# Patient Record
Sex: FEMALE | Race: WHITE | URBAN - METROPOLITAN AREA
[De-identification: names, ages, dates, MRNs, and addresses within clinical notes are randomized per-mention and may not be internally consistent; named-entity substitution may affect disease eponyms.]

---

## 2017-08-02 ENCOUNTER — OFFICE VISIT (OUTPATIENT)
Dept: INTERNAL MEDICINE CLINIC | Age: 32
End: 2017-08-02

## 2017-08-02 VITALS
DIASTOLIC BLOOD PRESSURE: 84 MMHG | HEIGHT: 67 IN | BODY MASS INDEX: 27.94 KG/M2 | TEMPERATURE: 98.7 F | WEIGHT: 178 LBS | RESPIRATION RATE: 18 BRPM | HEART RATE: 98 BPM | SYSTOLIC BLOOD PRESSURE: 126 MMHG | OXYGEN SATURATION: 98 %

## 2017-08-02 DIAGNOSIS — F41.9 ANXIETY DISORDER, UNSPECIFIED TYPE: ICD-10-CM

## 2017-08-02 DIAGNOSIS — F32.81 PMDD (PREMENSTRUAL DYSPHORIC DISORDER): ICD-10-CM

## 2017-08-02 DIAGNOSIS — Z00.00 PHYSICAL EXAM: ICD-10-CM

## 2017-08-02 DIAGNOSIS — L20.82 FLEXURAL ECZEMA: Primary | ICD-10-CM

## 2017-08-02 DIAGNOSIS — Z23 NEED FOR VACCINE FOR TD (TETANUS-DIPHTHERIA): ICD-10-CM

## 2017-08-02 NOTE — MR AVS SNAPSHOT
Visit Information Date & Time Provider Department Dept. Phone Encounter #  
 8/2/2017  3:00 PM Lala Alvarez MD [facility] Internal Medicine [phone] [id number] Upcoming Health Maintenance Date Due  
 PAP AKA CERVICAL CYTOLOGY 12/1/2015 INFLUENZA AGE 9 TO ADULT 8/1/2017 DTaP/Tdap/Td series (2 - Td) 8/2/2027 Allergies as of 8/2/2017  Review Complete On: 8/2/2017 By: Lala Alvarez MD  
  
 Severity Noted Reaction Type Reactions Amoxicillin  03/07/2013    Hives Childhood reaction Current Immunizations  Never Reviewed No immunizations on file. Not reviewed this visit You Were Diagnosed With   
  
 Codes Comments Flexural eczema    -  Primary ICD-10-CM: X76.74 ICD-9-CM: 691.8 PMDD (premenstrual dysphoric disorder)     ICD-10-CM: P33.56 ICD-9-CM: 625.4 Anxiety disorder, unspecified type     ICD-10-CM: F41.9 ICD-9-CM: 300.00 Physical exam     ICD-10-CM: Z00.00 ICD-9-CM: V70.9 Need for vaccine for TD (tetanus-diphtheria)     ICD-10-CM: Ami Abide ICD-9-CM: V06.5 Vitals BP Pulse Temp Resp Height(growth percentile) Weight(growth percentile) 126/84 (BP 1 Location: Left arm, BP Patient Position: Sitting) (!) 110 98.7 °F (37.1 °C) (Oral) 18 5' 7\" (1.702 m) 178 lb (80.7 kg) LMP SpO2 BMI OB Status Smoking Status 07/18/2017 98% 27.88 kg/m2 Having regular periods Former Smoker Vitals History BMI and BSA Data Body Mass Index Body Surface Area  
 27.88 kg/m 2 1.95 m 2 Preferred Pharmacy Pharmacy Name Phone [id number] [address] [phone] Your Updated Medication List  
  
   
This list is accurate as of: 8/2/17  4:24 PM.  Always use your most recent med list.  
  
  
  
  
 Diphth, Pertus(Acell), Tetanus 2.5-8-5 Lf-mcg-Lf/0.5mL Syrg Commonly known as:  ACEL  
0.5 mL by IntraMUSCular route once for 1 dose. omeprazole 40 mg capsule Commonly known as:  PRILOSEC Take 1 Cap by mouth daily. PRENATAL MULTI PO Take  by mouth. PROzac 20 mg capsule Generic drug:  FLUoxetine Take 20 mg by mouth daily. TRI-LINYAH 0.18/0.215/0.25 mg-35 mcg (28) Tab Generic drug:  norgestimate-ethinyl estradiol Take 1 Tab by mouth daily. Prescriptions Sent to Pharmacy Refills Jody Curran,, Tetanus (ACEL) 2.5-8-5 Lf-mcg-Lf/0.5mL syrg 0 Si.5 mL by IntraMUSCular route once for 1 dose. Class: Normal  
 [id number], [address] [facility].  #: [phone] Route: IntraMUSCular We Performed the Following REFERRAL TO CERTIFIED NURSE MIDWIFE [[id number] Custom] Comments:  
 Please evaluate patient for pregnancy To-Do List   
 2017 Lab:  CBC W/O DIFF   
  
 2017 Lab:  LIPID PANEL   
  
 2017 Lab:  METABOLIC PANEL, COMPREHENSIVE   
  
 2017 Lab:  TSH 3RD GENERATION   
  
 2017 Lab:  VITAMIN D, 25 HYDROXY Referral Information Referral ID Referred By Referred To  
  
 [id number] [facility] [address] [address] [address] [place] [address] Phone: [phone] Fax: [phone] Visits Status Start Date End Date 1 New Request 17 If your referral has a status of pending review or denied, additional information will be sent to support the outcome of this decision. Introducing [facility]! Michael Hart introduces Re[employer] patient portal. Now you can access parts of your medical record, email your doctor's office, and request medication refills online. 1. In your internet browser, go to [email or web address] 2. Click on the First Time User? Click Here link in the Sign In box. You will see the New Member Sign Up page. 3. Enter your Re[employer] Access Code exactly as it appears below.  You will not need to use this code after youve completed the sign-up process. If you do not sign up before the expiration date, you must request a new code. · EmergenSee Access Code: [email or web address] Expires: 10/31/2017  4:24 PM 
 
4. Enter the last four digits of your Social Security Number (xxxx) and Date of Birth (mm/dd/yyyy) as indicated and click Submit. You will be taken to the next sign-up page. 5. Create a EmergenSee ID. This will be your EmergenSee login ID and cannot be changed, so think of one that is secure and easy to remember. 6. Create a EmergenSee password. You can change your password at any time. 7. Enter your Password Reset Question and Answer. This can be used at a later time if you forget your password. 8. Enter your e-mail address. You will receive e-mail notification when new information is available in [address] 9. Click Sign Up. You can now view and download portions of your medical record. 10. Click the Download Summary menu link to download a portable copy of your medical information. If you have questions, please visit the Frequently Asked Questions section of the EmergenSee website. Remember, EmergenSee is NOT to be used for urgent needs. For medical emergencies, dial 911. Now available from your iPhone and Android! Please provide this summary of care documentation to your next provider. Your primary care clinician is listed as Jojo Cortez. If you have any questions after today's visit, please call [phone].

## 2017-08-02 NOTE — LETTER
8/18/2017 9:36 AM 
 
Ms. Osvaldo Gutierrez [id number] Dear Carloz Glass: 
 
Please find your most recent results below. Resulted Orders METABOLIC PANEL, COMPREHENSIVE Result Value Ref Range Glucose 101 (H) 65 - 99 mg/dL BUN 10 6 - 20 mg/dL Creatinine 0.70 0.57 - 1.00 mg/dL GFR est non- >59 mL/min/1.73 GFR est  >59 mL/min/1.73  
 BUN/Creatinine ratio 14 9 - 23 Sodium 140 134 - 144 mmol/L Potassium 4.6 3.5 - 5.2 mmol/L Chloride 102 96 - 106 mmol/L  
 CO2 26 18 - 29 mmol/L Calcium 9.4 8.7 - 10.2 mg/dL Protein, total 6.8 6.0 - 8.5 g/dL Albumin 4.5 3.5 - 5.5 g/dL GLOBULIN, TOTAL 2.3 1.5 - 4.5 g/dL A-G Ratio 2.0 1.2 - 2.2 Bilirubin, total 0.3 0.0 - 1.2 mg/dL Alk. phosphatase 62 39 - 117 IU/L  
 AST (SGOT) 19 0 - 40 IU/L  
 ALT (SGPT) 18 0 - 32 IU/L Narrative Performed at:  [address]  [id number] : Sonny Irby MD, Phone:  [phone] LIPID PANEL Result Value Ref Range Cholesterol, total 262 (H) 100 - 199 mg/dL Triglyceride 140 0 - 149 mg/dL HDL Cholesterol 70 >39 mg/dL VLDL, calculated 28 5 - 40 mg/dL LDL, calculated 164 (H) 0 - 99 mg/dL Narrative Performed at:  [address]  [id number] : Sonny Irby MD, Phone:  [phone] TSH 3RD GENERATION Result Value Ref Range TSH 0.722 0.450 - 4.500 uIU/mL Narrative Performed at:  [address]  [id number] : Sonny Irby MD, Phone:  [phone] VITAMIN D, 25 HYDROXY Result Value Ref Range VITAMIN D, 25-HYDROXY 26.0 (L) 30.0 - 100.0 ng/mL Comment:  
   Vitamin D deficiency has been defined by the [facility] practice guideline as a 
level of serum 25-OH vitamin D less than 20 ng/mL (1,2). The Endocrine Society went on to further define vitamin D 
insufficiency as a level between 21 and 29 ng/mL (2). 1. IOM ([place] of [employer]). 2010. Dietary reference 
   intakes for calcium and D. [facility]: The 
   [employer] Identity. 2. Efren MF, Praveen PEOPLES, Yanet RAMIREZ, et al. 
   Evaluation, treatment, and prevention of vitamin D 
   deficiency: an Endocrine Society clinical practice 
   guideline. JCEM. 2011 Jul; 96(7):1911-30. Narrative Performed at:  [address]  [id number] : Kylah Turk MD, Phone:  [phone] CBC W/O DIFF Result Value Ref Range WBC 8.7 3.4 - 10.8 x10E3/uL  
 RBC 4.59 3.77 - 5.28 x10E6/uL HGB 14.6 11.1 - 15.9 g/dL HCT 43.1 34.0 - 46.6 % MCV 94 79 - 97 fL  
 MCH 31.8 26.6 - 33.0 pg  
 MCHC 33.9 31.5 - 35.7 g/dL  
 RDW 12.4 12.3 - 15.4 % PLATELET 943 955 - 449 x10E3/uL Narrative Performed at:  [address]  [id number] : Kylah Turk MD, Phone:  [phone] CVD REPORT Result Value Ref Range INTERPRETATION Note Comment:  
   Supplement report is available. Narrative Performed at:  [address] [place] [address] [address] [place] [address]  [id number] : Lexis Holliday PhD, Phone:  [phone] HEMOGLOBIN A1C W/O EAG Result Value Ref Range Hemoglobin A1c 5.4 4.8 - 5.6 % Comment:  
            Pre-diabetes: 5.7 - 6.4 Diabetes: >6.4 Glycemic control for adults with diabetes: <7.0 Narrative Performed at:  [address]  [id number] : Kylah Turk MD, Phone:  [phone] SPECIMEN STATUS REPORT Result Value Ref Range SPECIMEN STATUS REPORT COMMENT Comment:  
   Written Authorization Written Authorization Written Authorization Received. Author[facility]- Logged by Noreen Dodd Narrative Performed at:  [address]  [id number] : Kylah Turk MD, Phone:  [phone] RECOMMENDATIONS: 
Labs are normal, keep up the good work. Call if any questions. Please call me if you have any questions: [phone] Sincerely, Pankaj Schmidt MD

## 2017-08-02 NOTE — PROGRESS NOTES
Written by [facility], as dictated by Dr. Ken Rogers MD.    Joe Glass is a 34 y.o. female. HPI  The patient comes in today to establish care. She and her  want to have a baby and would like a referral for a midwife. She has started taking prenatal multivitamins and Ca supplements, but did take a multivitamin before. She follows with an ob/gyn. Her last pap smear was in 2012. She has not taken Prozac in 3-4 years, but she used to take for PMDD. She has reduced her gluten intake, which has helped with her eczema. Her BMs are normal. She denies any heartburn, urinary issues. She does have a fhx (mother, brother) of thyroid disorders. She is not up to date on her Tdap vaccine. She goes to the gym 2-3 x per week. She used to have eczema on her arms & legs but since she has made dietary changes it`s not bothering anymore. Patient Active Problem List   Diagnosis Code    Anxiety and depression F41.9, F32.9    PMDD (premenstrual dysphoric disorder) F32.81    Eczema L30.9        Current Outpatient Prescriptions on File Prior to Visit   Medication Sig Dispense Refill    norgestimate-ethinyl estradiol (TRI-LINYAH) 0.18/0.215/0.25 mg-35 mcg (28) tablet Take 1 Tab by mouth daily.  omeprazole (PRILOSEC) 40 mg capsule Take 1 Cap by mouth daily. 30 Cap 1    FLUoxetine (PROZAC) 20 mg capsule Take 20 mg by mouth daily. No current facility-administered medications on file prior to visit.         Allergies   Allergen Reactions    Amoxicillin Hives     Childhood reaction       Past Medical History:   Diagnosis Date    Eczema     History of abnormal Pap smear     2008    PMDD (premenstrual dysphoric disorder)        Past Surgical History:   Procedure Laterality Date    HX LEEP PROCEDURE  2/24/2008    HX WISDOM TEETH EXTRACTION      14 yo       Family History   Problem Relation Age of Onset    Hypertension Brother     Thyroid Disease Brother     Heart Disease Maternal Grandfather     Stroke Paternal Grandfather        Social History     Social History    Marital status:      Spouse name: N/A    Number of children: N/A    Years of education: N/A     Occupational History    Not on file. Social History Main Topics    Smoking status: Former Smoker     Quit date: 2/1/2006    Smokeless tobacco: Former User    Alcohol use 0.0 oz/week     1 - 2 drink(s) per week      Comment: occasionally, not often    Drug use: Yes     Special: Marijuana      Comment: daily    Sexual activity: Yes     Partners: Male     Other Topics Concern    Not on file     Social History Narrative           Review of Systems   Constitutional: Negative for malaise/fatigue. HENT: Negative for congestion. Eyes: Negative for blurred vision and pain. Respiratory: Negative for cough and shortness of breath. Cardiovascular: Negative for chest pain and palpitations. Gastrointestinal: Negative for abdominal pain and heartburn. Genitourinary: Negative for frequency and urgency. Musculoskeletal: Negative for joint pain and myalgias. Neurological: Negative for dizziness, tingling, sensory change, weakness and headaches. Psychiatric/Behavioral: Negative for depression, memory loss and substance abuse. Visit Vitals    /84 (BP 1 Location: Left arm, BP Patient Position: Sitting)    Pulse 98    Temp 98.7 °F (37.1 °C) (Oral)    Resp 18    Ht 5' 7\" (1.702 m)    Wt 178 lb (80.7 kg)    LMP 07/18/2017    SpO2 98%    BMI 27.88 kg/m2       Physical Exam   Constitutional: She is oriented to person, place, and time. She appears well-developed and well-nourished. No distress. HENT:   Right Ear: External ear normal.   Left Ear: External ear normal.   Eyes: Conjunctivae and EOM are normal. Right eye exhibits no discharge. Left eye exhibits no discharge. Neck: Normal range of motion. Neck supple. Cardiovascular: Normal rate and regular rhythm.     Pulses: Dorsalis pedis pulses are 2+ on the right side, and 2+ on the left side. Pulmonary/Chest: Effort normal and breath sounds normal. She has no wheezes. Abdominal: Soft. Bowel sounds are normal. There is no tenderness. Lymphadenopathy:     She has no cervical adenopathy. Neurological: She is alert and oriented to person, place, and time. Reflex Scores:       Patellar reflexes are 2+ on the right side and 2+ on the left side. Skin: She is not diaphoretic. Psychiatric: She has a normal mood and affect. Her behavior is normal.   Nursing note and vitals reviewed. ASSESSMENT and PLAN    ICD-10-CM ICD-9-CM    1. Flexural eczema L20.82 691.8 Eczema has improved with dietary changes. 2. PMDD (premenstrual dysphoric disorder) F32.81 625.4 Pt does not take Prozac anymore. 3. Anxiety disorder, unspecified type F41.9 300.00 Pt is doing well without medication. 4. Physical exam L70.37 L10.5 METABOLIC PANEL, COMPREHENSIVE      LIPID PANEL      TSH 3RD GENERATION      VITAMIN D, 25 HYDROXY      CBC W/O DIFF      REFERRAL TO CERTIFIED NURSE MIDWIFE    Complete physical exam done. Pt will return during lab hours to have basic fasting labs drawn. Referral to midwife placed. 5. Need for vaccine for TD (tetanus-diphtheria) Z23 V06.5 Diphth, Pertus,Acell,, Tetanus (ACEL) 2.5-8-5 Lf-mcg-Lf/0.5mL syrg sent to pharmacy    I want her to get her Tdap vaccine. This plan was reviewed with the patient and patient agrees. All questions were answered. This scribe documentation was reviewed by me and accurately reflects the examination and decisions made by me.

## 2017-08-02 NOTE — PROGRESS NOTES
Chief Complaint   Patient presents with   Cuauhtemoc Ortiz [facility]     new patient     1. Have you been to the ER, urgent care clinic since your last visit? Hospitalized since your last visit? Yes When: July 2017 [facility]    2. Have you seen or consulted any other health care providers outside of the [address] [place] [address] since your last visit? Include any pap smears or colon screening.  No

## 2017-08-05 LAB
25(OH)D3+25(OH)D2 SERPL-MCNC: 26 NG/ML (ref 30–100)
ALBUMIN SERPL-MCNC: 4.5 G/DL (ref 3.5–5.5)
ALBUMIN/GLOB SERPL: 2 {RATIO} (ref 1.2–2.2)
ALP SERPL-CCNC: 62 IU/L (ref 39–117)
ALT SERPL-CCNC: 18 IU/L (ref 0–32)
AST SERPL-CCNC: 19 IU/L (ref 0–40)
BILIRUB SERPL-MCNC: 0.3 MG/DL (ref 0–1.2)
BUN SERPL-MCNC: 10 MG/DL (ref 6–20)
BUN/CREAT SERPL: 14 (ref 9–23)
CALCIUM SERPL-MCNC: 9.4 MG/DL (ref 8.7–10.2)
CHLORIDE SERPL-SCNC: 102 MMOL/L (ref 96–106)
CHOLEST SERPL-MCNC: 262 MG/DL (ref 100–199)
CO2 SERPL-SCNC: 26 MMOL/L (ref 18–29)
CREAT SERPL-MCNC: 0.7 MG/DL (ref 0.57–1)
ERYTHROCYTE [DISTWIDTH] IN BLOOD BY AUTOMATED COUNT: 12.4 % (ref 12.3–15.4)
GLOBULIN SER CALC-MCNC: 2.3 G/DL (ref 1.5–4.5)
GLUCOSE SERPL-MCNC: 101 MG/DL (ref 65–99)
HCT VFR BLD AUTO: 43.1 % (ref 34–46.6)
HDLC SERPL-MCNC: 70 MG/DL
HGB BLD-MCNC: 14.6 G/DL (ref 11.1–15.9)
INTERPRETATION, 910389: NORMAL
LDLC SERPL CALC-MCNC: 164 MG/DL (ref 0–99)
MCH RBC QN AUTO: 31.8 PG (ref 26.6–33)
MCHC RBC AUTO-ENTMCNC: 33.9 G/DL (ref 31.5–35.7)
MCV RBC AUTO: 94 FL (ref 79–97)
PLATELET # BLD AUTO: 293 X10E3/UL (ref 150–379)
POTASSIUM SERPL-SCNC: 4.6 MMOL/L (ref 3.5–5.2)
PROT SERPL-MCNC: 6.8 G/DL (ref 6–8.5)
RBC # BLD AUTO: 4.59 X10E6/UL (ref 3.77–5.28)
SODIUM SERPL-SCNC: 140 MMOL/L (ref 134–144)
TRIGL SERPL-MCNC: 140 MG/DL (ref 0–149)
TSH SERPL DL<=0.005 MIU/L-ACNC: 0.72 UIU/ML (ref 0.45–4.5)
VLDLC SERPL CALC-MCNC: 28 MG/DL (ref 5–40)
WBC # BLD AUTO: 8.7 X10E3/UL (ref 3.4–10.8)

## 2017-08-07 NOTE — PROGRESS NOTES
pls add HbA1C to her labs. Ask her if she has h/o Hyperlipidemia. Also should take Vitamin d 800 I.U daily as her levels are little low.

## 2017-08-08 NOTE — PROGRESS NOTES
If she is vegan then probably she is consuming lots of carbs. Needs to cut down on carbs before we put her on Lipitor.

## 2017-08-08 NOTE — PROGRESS NOTES
Added HA1C on at this time. Spoke with pt who stated she doesn't think anyone has high cholesterol in her family. She said her brother may have. She stated she eats a gluten free diet with mostly veges. And she exercised daily, she has been doing all this over the past year. She would like to know what the next plan of care is for her elevated cholesterol.

## 2017-08-16 LAB
HBA1C MFR BLD: 5.4 % (ref 4.8–5.6)
SPECIMEN STATUS REPORT, ROLRST: NORMAL

## 2017-09-08 ENCOUNTER — OFFICE VISIT (OUTPATIENT)
Dept: OBGYN CLINIC | Age: 32
End: 2017-09-08

## 2017-09-08 VITALS
DIASTOLIC BLOOD PRESSURE: 84 MMHG | HEIGHT: 67 IN | BODY MASS INDEX: 28.44 KG/M2 | SYSTOLIC BLOOD PRESSURE: 150 MMHG | WEIGHT: 181.2 LBS

## 2017-09-08 DIAGNOSIS — Z01.419 ENCOUNTER FOR ANNUAL ROUTINE GYNECOLOGICAL EXAMINATION: Primary | ICD-10-CM

## 2017-09-08 DIAGNOSIS — Z98.890 HISTORY OF LOOP ELECTROSURGICAL EXCISION PROCEDURE (LEEP) OF CERVIX: ICD-10-CM

## 2017-09-08 PROBLEM — R87.610 ATYPICAL SQUAMOUS CELLS OF UNDETERMINED SIGNIFICANCE ON CYTOLOGIC SMEAR OF CERVIX (ASC-US): Status: ACTIVE | Noted: 2017-09-08

## 2017-09-08 NOTE — PROGRESS NOTES
Annual exam ages 21-44    Grey Glass is a No obstetric history on file. ,  34 y.o. female [facility] Patient's last menstrual period was 08/18/2017 (exact date). .    She presents for her annual checkup. She is having no significant problems. History of abnormal pap with LEEP    With regard to the Gardasil vaccine, she is older than the FDA approved age to receive it. Menstrual status:    Her periods are moderate in flow. She is using three to five pads or tampons per day, usually regular and last 26-30 days. She denies dysmenorrhea. She reports no premenstrual symptoms. Contraception:    The current method of family planning is none. Sexual history:     She  reports that she currently engages in sexual activity and has had male partners. She reports using the following method of birth control/protection: None. .    Medical conditions:    Since her last annual GYN exam about three or more years ago, she has not the following changes in her health history: none. Pap and Mammogram History:    Her most recent Pap smear was normal, obtained 5 year(s) ago. The patient has never had a mammogram.    Breast Cancer History/Substance Abuse: negative    Past Medical History:   Diagnosis Date    Eczema     History of abnormal Pap smear     2008    PMDD (premenstrual dysphoric disorder)      Past Surgical History:   Procedure Laterality Date    HX LEEP PROCEDURE  2/24/2008    HX WISDOM TEETH EXTRACTION      14 yo       Current Outpatient Prescriptions   Medication Sig Dispense Refill    PRENATAL /IRON/FOLIC ACID (PRENATAL MULTI PO) Take  by mouth.  norgestimate-ethinyl estradiol (TRI-LINYAH) 0.18/0.215/0.25 mg-35 mcg (28) tablet Take 1 Tab by mouth daily.  omeprazole (PRILOSEC) 40 mg capsule Take 1 Cap by mouth daily. 30 Cap 1    FLUoxetine (PROZAC) 20 mg capsule Take 20 mg by mouth daily.        Allergies: Amoxicillin     Tobacco History:  reports that she quit smoking about 11 years ago. She has quit using smokeless tobacco.  Alcohol Abuse:  reports that she drinks alcohol. Drug Abuse:  reports that she uses illicit drugs, including Marijuana.     Family Medical/Cancer History:   Family History   Problem Relation Age of Onset    Hypertension Brother     Thyroid Disease Brother     Heart Disease Maternal Grandfather     Stroke Paternal Grandfather         Review of Systems - History obtained from the patient  Constitutional: negative for weight loss, fever, night sweats  HEENT: negative for hearing loss, earache, congestion, snoring, sorethroat  CV: negative for chest pain, palpitations, edema  Resp: negative for cough, shortness of breath, wheezing  GI: negative for change in bowel habits, abdominal pain, black or bloody stools  : negative for frequency, dysuria, hematuria, vaginal discharge  MSK: negative for back pain, joint pain, muscle pain  Breast: negative for breast lumps, nipple discharge, galactorrhea  Skin :negative for itching, rash, hives  Neuro: negative for dizziness, headache, confusion, weakness  Psych: negative for anxiety, depression, change in mood  Heme/lymph: negative for bleeding, bruising, pallor    Physical Exam    Visit Vitals    /84    Ht 5' 7\" (1.702 m)    Wt 181 lb 3.2 oz (82.2 kg)    LMP 08/18/2017 (Exact Date)    BMI 28.38 kg/m2       Constitutional  · Appearance: well-nourished, well developed, alert, in no acute distress    HENT  · Head and Face: appears normal      Breasts  · Inspection of Breasts: breasts symmetrical, no skin changes, no discharge present, nipple appearance normal, no skin retraction present  · Palpation of Breasts and Axillae: no masses present on palpation, no breast tenderness  · Axillary Lymph Nodes: no lymphadenopathy present    Gastrointestinal  · Abdominal Examination: abdomen non-tender to palpation, no masses present  · Liver and spleen: no hepatomegaly present, spleen not palpable  · Hernias: no hernias identified    Genitourinary  · External Genitalia: normal appearance for age, no discharge present, no tenderness present, no inflammatory lesions present, no masses present, no atrophy present  · Vagina: normal vaginal vault without central or paravaginal defects, no discharge present, no inflammatory lesions present, no masses present  · Bladder: non-tender to palpation  · Urethra: appears normal  · Cervix: normal   · Uterus: normal size, shape and consistency  · Adnexa: no adnexal tenderness present, no adnexal masses present  · Perineum: perineum within normal limits, no evidence of trauma, no rashes or skin lesions present  · Anus: anus within normal limits, no hemorrhoids present  · Inguinal Lymph Nodes: no lymphadenopathy present    Skin  · General Inspection: no rash, no lesions identified    Neurologic/Psychiatric  · Mental Status:  · Orientation: grossly oriented to person, place and time  · Mood and Affect: mood normal, affect appropriate    . Assessment:  Routine gynecologic examination- pap obtained - hx abnormal  Her current medical status is satisfactory with no evidence of significant gynecologic issues.   Wants to conceive - this visit is for pre conception to ensure she is health prior to    has HSV- she has never had an out break reviewed HSV risks in pregnancy and delivery     Plan:    Counseled re: diet, exercise, healthy lifestyle  Return for yearly wellness visits  Gardisil counseling provided  Pt counseled regarding co-testing for high risk HPV with pap  Rec screening mammo at either 34 or [facility]

## 2017-09-08 NOTE — PATIENT INSTRUCTIONS

## 2017-09-14 LAB
CYTOLOGIST CVX/VAG CYTO: ABNORMAL
CYTOLOGY CVX/VAG DOC THIN PREP: ABNORMAL
DX ICD CODE: ABNORMAL
DX ICD CODE: ABNORMAL
HPV I/H RISK 4 DNA CVX QL PROBE+SIG AMP: POSITIVE
Lab: ABNORMAL
OTHER STN SPEC: ABNORMAL
PATH REPORT.FINAL DX SPEC: ABNORMAL
PATHOLOGIST CVX/VAG CYTO: ABNORMAL
STAT OF ADQ CVX/VAG CYTO-IMP: ABNORMAL

## 2017-09-21 NOTE — PROGRESS NOTES
She needs a colpo- please call her and review her results and have her see Dr. Radha Sousa or Yuli Persaud for the colpo thank you

## 2017-10-02 ENCOUNTER — OFFICE VISIT (OUTPATIENT)
Dept: OBGYN CLINIC | Age: 32
End: 2017-10-02

## 2017-10-02 VITALS
WEIGHT: 177.8 LBS | HEART RATE: 80 BPM | RESPIRATION RATE: 18 BRPM | DIASTOLIC BLOOD PRESSURE: 86 MMHG | SYSTOLIC BLOOD PRESSURE: 138 MMHG | BODY MASS INDEX: 27.85 KG/M2 | TEMPERATURE: 98.1 F

## 2017-10-02 DIAGNOSIS — Z13.9 SCREENING PROCEDURE: Primary | ICD-10-CM

## 2017-10-02 DIAGNOSIS — R87.612 LGSIL ON PAP SMEAR OF CERVIX: ICD-10-CM

## 2017-10-02 DIAGNOSIS — B97.7 HPV IN FEMALE: ICD-10-CM

## 2017-10-02 LAB
HCG URINE, QL. (POC): NEGATIVE
VALID INTERNAL CONTROL?: YES

## 2017-10-02 NOTE — PATIENT INSTRUCTIONS
Colposcopy: What to Expect at [address] may feel some soreness in your vagina for a day or two if you had a biopsy. Some vaginal bleeding or discharge is normal for up to a week after a biopsy. The discharge may be dark-colored if a solution was put on your cervix. You can use a sanitary pad for the bleeding. It may take a week or two for you to get the test results. This care sheet gives you a general idea about how long it will take for you to recover. But each person recovers at a different pace. Follow the steps below to feel better as quickly as possible. How can you care for yourself at home? Activity  · You can return to work and most daily activities right after the test.  Exercise  · Do not exercise for 1 day after the test.  Medicines  · Your doctor will tell you if and when you can restart your medicines. He or she will also give you instructions about taking any new medicines. · If you take blood thinners, such as warfarin (Coumadin), clopidogrel (Plavix), or aspirin, be sure to talk to your doctor. He or she will tell you if and when to start taking those medicines again. Make sure that you understand exactly what your doctor wants you to do. · Take an over-the-counter pain medicine, such as acetaminophen (Tylenol), ibuprofen (Advil, Motrin), or naproxen (Aleve). Be safe with medicines. Read and follow all instructions on the label. Do not take two or more pain medicines at the same time unless the doctor told you to. Many pain medicines have acetaminophen, which is Tylenol. Too much acetaminophen (Tylenol) can be harmful. Other instructions  · Use a pad if you have some bleeding. · Do not douche, have sexual intercourse, or use tampons for 1 week if you had a biopsy. This will allow time for your cervix to heal.  · You can take a bath or shower anytime after the test.  Follow-up care is a key part of your treatment and safety.  Be sure to make and go to all appointments, and call your doctor if you are having problems. It's also a good idea to know your test results and keep a list of the medicines you take. When should you call for help? Call your doctor now or seek immediate medical care if:  · You have severe vaginal bleeding. This means that you are soaking through your usual pads or tampons each hour for 2 or more hours. · You have pain that does not get better after you take pain medicine. · You have signs of infection, such as:  ¨ Increased pain. ¨ Bad-smelling vaginal discharge. ¨ A fever. Watch closely for any changes in your health, and be sure to contact your doctor if:  · You have questions or concerns. Where can you learn more? Go to http://adriana-kristopher.info/. Enter M523 in the search box to learn more about \"Colposcopy: What to Expect at Home. \"  Current as of: May 3, 2017  Content Version: 11.3  © 6796-6137 [employer]. Care instructions adapted under license by [employer] (which disclaims liability or warranty for this information). If you have questions about a medical condition or this instruction, always ask your healthcare professional. [place] [address] any warranty or liability for your use of this information.

## 2017-10-02 NOTE — PROGRESS NOTES
[facility]  OFFICE PROCEDURE PROGRESS NOTE    Chart reviewed for the following:   Sloane BURR, have reviewed the History, Physical and updated the Allergic reactions for Berna Glass     TIME OUT performed immediately prior to start of procedure:   Sloane BURR, have performed the following reviews on Berna Glass prior to the start of the procedure:            * Patient was identified by name and date of birth   * Agreement on procedure being performed was verified  * Risks and Benefits explained to the patient  * Consent was signed and verified     Time: 2:33 pm    Date of procedure: 10/2/2017    Procedure performed by:  Amanda Hameed MD    Provider assisted by: Sheron Link RN    Patient assisted by: self    How tolerated by patient: tolerated the procedure well with no complications    Post Procedural Pain Scale: 0 - No Hurt    Comments: none    Procedure Note: Colposcopy    35 yo  with pmh of cervical dysplasia requiring multiple prior colposcopies and a LEEP procedure in . Pt reports she had one normal pap after her LEEP, but subsequent paps were then abnormal. Last colposcopy was around . Pt previously followed with Dr. Eloy Washington of [facility] (will request pap records). Pt referred for colposcopy after pap smear from 16 was LGSIL HPV+ (per Cass Hsu CNM). Patient's last menstrual period was 2017 (exact date). UPT neg today. Pt and her  would like to begin trying to conceive, so she wants to make sure health maintenance is up to date. She is former smoker. Remote h/o chlamydia,  HSV positive, no other STIs. After being presented with the risks, benefits and alternatives has she signed a consent for the procedure. She states that she understands the need for the procedure and has no further questions. She was informed that she may experience discomfort.     Procedure:  She was positioned in the dorsal lithotomy position and a speculum was inserted into the vagina. Dilute acetic acid was applied to the cervix. The colposcope was used to visualize the cervix. Procedure: The transformation zone was completely visualized. Findings: adequate colposcopy, SCJ visualized in entirity. Scant AWC noted at 7, 9, and 11 o'clock on cervix (ectocervical biopsies were taken at these locations). ECC was performed. Cervix otherwise normal appearing. This colposcopy was satisfactory. Biopsies were placed in formalin, and sent to pathology. See accompanying diagram for biopsy sites. Monsels was applied to the cervix. Post Procedure Status: The patient tolerated the procedure well with minimal discomfort.      A/P: pending pathology  -records requested from Dr. Mathieu Tan office    Melchor Garcia MD  10/2/2017  3:11 PM

## 2017-10-02 NOTE — MR AVS SNAPSHOT
Visit Information Date & Time Provider Department Dept. Phone Encounter #  
 10/2/2017  2:20 PM Fiona Darby MD [address] [facility] [phone] [id number] Upcoming Health Maintenance Date Due INFLUENZA AGE 9 TO ADULT 8/1/2017 PAP AKA CERVICAL CYTOLOGY 9/8/2020 Allergies as of 10/2/2017  Review Complete On: 10/2/2017 By: Rc Both Severity Noted Reaction Type Reactions Amoxicillin  03/07/2013    Hives Childhood reaction Current Immunizations  Never Reviewed No immunizations on file. Not reviewed this visit You Were Diagnosed With   
  
 Codes Comments Screening procedure    -  Primary ICD-10-CM: Z13.9 ICD-9-CM: V82.9 Vitals BP Pulse Temp Resp Weight(growth percentile) LMP  
 138/86 (BP 1 Location: Right arm, BP Patient Position: Sitting) 80 98.1 °F (36.7 °C) (Oral) 18 177 lb 12.8 oz (80.6 kg) 09/13/2017 (Exact Date) BMI OB Status Smoking Status 27.85 kg/m2 Having regular periods Former Smoker BMI and BSA Data Body Mass Index Body Surface Area  
 27.85 kg/m 2 1.95 m 2 Preferred Pharmacy Pharmacy Name Phone [facility] [id number] [place] [facility] [place], [address] [facility]. [phone] Your Updated Medication List  
  
   
This list is accurate as of: 10/2/17  3:08 PM.  Always use your most recent med list.  
  
  
  
  
 omeprazole 40 mg capsule Commonly known as:  PRILOSEC Take 1 Cap by mouth daily. PRENATAL MULTI PO Take  by mouth. PROzac 20 mg capsule Generic drug:  FLUoxetine Take 20 mg by mouth daily. TRI-LINYAH 0.18/0.215/0.25 mg-35 mcg (28) Tab Generic drug:  norgestimate-ethinyl estradiol Take 1 Tab by mouth daily. We Performed the Following AMB POC URINE PREGNANCY TEST, VISUAL COLOR COMPARISON [91727 CPT(R)] Patient Instructions Colposcopy: What to Expect at [facility] Your Recovery You may feel some soreness in your vagina for a day or two if you had a biopsy. Some vaginal bleeding or discharge is normal for up to a week after a biopsy. The discharge may be dark-colored if a solution was put on your cervix. You can use a sanitary pad for the bleeding. It may take a week or two for you to get the test results. This care sheet gives you a general idea about how long it will take for you to recover. But each person recovers at a different pace. Follow the steps below to feel better as quickly as possible. How can you care for yourself at home? Activity · You can return to work and most daily activities right after the test. 
Exercise · Do not exercise for 1 day after the test. 
Medicines · Your doctor will tell you if and when you can restart your medicines. He or she will also give you instructions about taking any new medicines. · If you take blood thinners, such as warfarin (Coumadin), clopidogrel (Plavix), or aspirin, be sure to talk to your doctor. He or she will tell you if and when to start taking those medicines again. Make sure that you understand exactly what your doctor wants you to do. · Take an over-the-counter pain medicine, such as acetaminophen (Tylenol), ibuprofen (Advil, Motrin), or naproxen (Aleve). Be safe with medicines. Read and follow all instructions on the label. Do not take two or more pain medicines at the same time unless the doctor told you to. Many pain medicines have acetaminophen, which is Tylenol. Too much acetaminophen (Tylenol) can be harmful. Other instructions · Use a pad if you have some bleeding. · Do not douche, have sexual intercourse, or use tampons for 1 week if you had a biopsy. This will allow time for your cervix to heal. 
· You can take a bath or shower anytime after the test. 
Follow-up care is a key part of your treatment and safety.  Be sure to make and go to all appointments, and call your doctor if you are having problems. It's also a good idea to know your test results and keep a list of the medicines you take. When should you call for help? Call your doctor now or seek immediate medical care if: 
· You have severe vaginal bleeding. This means that you are soaking through your usual pads or tampons each hour for 2 or more hours. · You have pain that does not get better after you take pain medicine. · You have signs of infection, such as: 
¨ Increased pain. ¨ Bad-smelling vaginal discharge. ¨ A fever. Watch closely for any changes in your health, and be sure to contact your doctor if: 
· You have questions or concerns. Where can you learn more? Go to http://adriana-kristopher.info/. Enter M523 in the search box to learn more about \"Colposcopy: What to Expect at Home. \" Current as of: May 3, 2017 Content Version: 11.3 © 6667-5082 [employer]. Care instructions adapted under license by [employer] (which disclaims liability or warranty for this information). If you have questions about a medical condition or this instruction, always ask your healthcare professional. [place] [address] any warranty or liability for your use of this information. Introducing [facility]! Dear Fernie Durham: Thank you for requesting a [facility] account. Our records indicate that you already have an active [facility] account. You can access your account anytime at [email or web address] Did you know that you can access your hospital and ER discharge instructions at any time in [facility]? You can also review all of your test results from your hospital stay or ER visit. Additional Information If you have questions, please visit the Frequently Asked Questions section of the [facility] website at [email or web address] Remember, [facility] is NOT to be used for urgent needs. For medical emergencies, dial 911. Now available from your iPhone and Android! Please provide this summary of care documentation to your next provider. Your primary care clinician is listed as Daniel Roldan. If you have any questions after today's visit, please call [phone].

## 2017-10-04 LAB
DX ICD CODE: NORMAL
PATH REPORT.FINAL DX SPEC: NORMAL
PATH REPORT.GROSS SPEC: NORMAL
PATH REPORT.SITE OF ORIGIN SPEC: NORMAL
PATHOLOGIST NAME: NORMAL
PAYMENT PROCEDURE: NORMAL

## 2017-10-05 NOTE — PROGRESS NOTES
Colposcopy biopsy results showing only low grade cellular changes on the cervix (CIN1). Please advise patient that next step in management is repeat cotesting in 12 months.

## 2017-12-06 DIAGNOSIS — Z98.890 HISTORY OF LOOP ELECTROSURGICAL EXCISION PROCEDURE (LEEP) OF CERVIX: Primary | ICD-10-CM
